# Patient Record
Sex: FEMALE | Race: WHITE
[De-identification: names, ages, dates, MRNs, and addresses within clinical notes are randomized per-mention and may not be internally consistent; named-entity substitution may affect disease eponyms.]

---

## 2018-03-01 ENCOUNTER — HOSPITAL ENCOUNTER (OUTPATIENT)
Dept: HOSPITAL 92 - BICMAMMO | Age: 58
Discharge: HOME | End: 2018-03-01
Attending: FAMILY MEDICINE
Payer: COMMERCIAL

## 2018-03-01 DIAGNOSIS — Z78.0: Primary | ICD-10-CM

## 2018-03-01 DIAGNOSIS — M81.0: ICD-10-CM

## 2018-03-01 PROCEDURE — 77080 DXA BONE DENSITY AXIAL: CPT

## 2018-03-09 ENCOUNTER — HOSPITAL ENCOUNTER (OUTPATIENT)
Dept: HOSPITAL 92 - BICMAMMO | Age: 58
Discharge: HOME | End: 2018-03-09
Attending: FAMILY MEDICINE
Payer: COMMERCIAL

## 2018-03-09 DIAGNOSIS — Z12.31: Primary | ICD-10-CM

## 2018-03-09 PROCEDURE — 77067 SCR MAMMO BI INCL CAD: CPT

## 2020-01-30 ENCOUNTER — HOSPITAL ENCOUNTER (INPATIENT)
Dept: HOSPITAL 92 - SURG A | Age: 60
LOS: 36 days | Discharge: HOME | DRG: 470 | End: 2020-03-06
Attending: ORTHOPAEDIC SURGERY | Admitting: ORTHOPAEDIC SURGERY
Payer: COMMERCIAL

## 2020-01-30 VITALS — BODY MASS INDEX: 29 KG/M2

## 2020-01-30 DIAGNOSIS — R19.7: ICD-10-CM

## 2020-01-30 DIAGNOSIS — M16.11: Primary | ICD-10-CM

## 2020-01-30 DIAGNOSIS — G43.909: ICD-10-CM

## 2020-01-30 DIAGNOSIS — F32.9: ICD-10-CM

## 2020-01-30 DIAGNOSIS — Z79.899: ICD-10-CM

## 2020-01-30 DIAGNOSIS — Z90.710: ICD-10-CM

## 2020-01-30 DIAGNOSIS — F43.10: ICD-10-CM

## 2020-01-30 DIAGNOSIS — I10: ICD-10-CM

## 2020-01-30 DIAGNOSIS — D53.9: ICD-10-CM

## 2020-01-30 DIAGNOSIS — G47.00: ICD-10-CM

## 2020-01-30 DIAGNOSIS — E78.5: ICD-10-CM

## 2020-01-30 PROCEDURE — 36415 COLL VENOUS BLD VENIPUNCTURE: CPT

## 2020-01-30 PROCEDURE — 85027 COMPLETE CBC AUTOMATED: CPT

## 2020-01-30 PROCEDURE — 90471 IMMUNIZATION ADMIN: CPT

## 2020-01-30 PROCEDURE — G0008 ADMIN INFLUENZA VIRUS VAC: HCPCS

## 2020-01-30 PROCEDURE — 84132 ASSAY OF SERUM POTASSIUM: CPT

## 2020-01-30 PROCEDURE — 90686 IIV4 VACC NO PRSV 0.5 ML IM: CPT

## 2020-02-04 ENCOUNTER — HOSPITAL ENCOUNTER (OUTPATIENT)
Dept: HOSPITAL 92 - LABBT | Age: 60
Discharge: HOME | End: 2020-02-04
Attending: ORTHOPAEDIC SURGERY
Payer: COMMERCIAL

## 2020-02-04 DIAGNOSIS — M16.11: ICD-10-CM

## 2020-02-04 DIAGNOSIS — Z01.818: Primary | ICD-10-CM

## 2020-02-04 LAB
ANION GAP SERPL CALC-SCNC: 12 MMOL/L (ref 10–20)
APTT PPP: 29.1 SEC (ref 22.9–36.1)
BASOPHILS # BLD AUTO: 0.1 THOU/UL (ref 0–0.2)
BASOPHILS NFR BLD AUTO: 0.7 % (ref 0–1)
BUN SERPL-MCNC: 15 MG/DL (ref 9.8–20.1)
CALCIUM SERPL-MCNC: 9.5 MG/DL (ref 7.8–10.44)
CHLORIDE SERPL-SCNC: 103 MMOL/L (ref 98–107)
CO2 SERPL-SCNC: 29 MMOL/L (ref 22–29)
CREAT CL PREDICTED SERPL C-G-VRATE: 0 ML/MIN (ref 70–130)
EOSINOPHIL # BLD AUTO: 0.2 THOU/UL (ref 0–0.7)
EOSINOPHIL NFR BLD AUTO: 2 % (ref 0–10)
GLUCOSE SERPL-MCNC: 99 MG/DL (ref 70–105)
HGB BLD-MCNC: 13.5 G/DL (ref 12–16)
INR PPP: 1
LYMPHOCYTES # BLD: 2.6 THOU/UL (ref 1.2–3.4)
LYMPHOCYTES NFR BLD AUTO: 30.7 % (ref 21–51)
MCH RBC QN AUTO: 28 PG (ref 27–31)
MCV RBC AUTO: 87.1 FL (ref 78–98)
MONOCYTES # BLD AUTO: 0.6 THOU/UL (ref 0.11–0.59)
MONOCYTES NFR BLD AUTO: 6.8 % (ref 0–10)
NEUTROPHILS # BLD AUTO: 5 THOU/UL (ref 1.4–6.5)
NEUTROPHILS NFR BLD AUTO: 59.8 % (ref 42–75)
PLATELET # BLD AUTO: 362 THOU/UL (ref 130–400)
POTASSIUM SERPL-SCNC: 3.2 MMOL/L (ref 3.5–5.1)
PROTHROMBIN TIME: 12.8 SEC (ref 12–14.7)
RBC # BLD AUTO: 4.83 MILL/UL (ref 4.2–5.4)
RBC UR QL AUTO: (no result) HPF (ref 0–3)
SODIUM SERPL-SCNC: 141 MMOL/L (ref 136–145)
WBC # BLD AUTO: 8.3 THOU/UL (ref 4.8–10.8)
WBC UR QL AUTO: (no result) HPF (ref 0–3)

## 2020-02-04 PROCEDURE — 87081 CULTURE SCREEN ONLY: CPT

## 2020-02-04 PROCEDURE — 81001 URINALYSIS AUTO W/SCOPE: CPT

## 2020-02-04 PROCEDURE — 80048 BASIC METABOLIC PNL TOTAL CA: CPT

## 2020-02-04 PROCEDURE — 85610 PROTHROMBIN TIME: CPT

## 2020-02-04 PROCEDURE — 93005 ELECTROCARDIOGRAM TRACING: CPT

## 2020-02-04 PROCEDURE — 85025 COMPLETE CBC W/AUTO DIFF WBC: CPT

## 2020-02-04 PROCEDURE — 93010 ELECTROCARDIOGRAM REPORT: CPT

## 2020-02-04 PROCEDURE — 85730 THROMBOPLASTIN TIME PARTIAL: CPT

## 2020-02-27 ENCOUNTER — HOSPITAL ENCOUNTER (OUTPATIENT)
Dept: HOSPITAL 92 - LABBT | Age: 60
Discharge: HOME | End: 2020-02-27
Attending: ORTHOPAEDIC SURGERY
Payer: COMMERCIAL

## 2020-02-27 DIAGNOSIS — Z01.812: Primary | ICD-10-CM

## 2020-02-27 DIAGNOSIS — M16.11: ICD-10-CM

## 2020-02-27 LAB
ANION GAP SERPL CALC-SCNC: 14 MMOL/L (ref 10–20)
BUN SERPL-MCNC: 22 MG/DL (ref 9.8–20.1)
CALCIUM SERPL-MCNC: 9.1 MG/DL (ref 7.8–10.44)
CHLORIDE SERPL-SCNC: 99 MMOL/L (ref 98–107)
CO2 SERPL-SCNC: 28 MMOL/L (ref 22–29)
CREAT CL PREDICTED SERPL C-G-VRATE: 0 ML/MIN (ref 70–130)
GLUCOSE SERPL-MCNC: 103 MG/DL (ref 70–105)
POTASSIUM SERPL-SCNC: 2.9 MMOL/L (ref 3.5–5.1)
SODIUM SERPL-SCNC: 138 MMOL/L (ref 136–145)

## 2020-02-27 PROCEDURE — 80048 BASIC METABOLIC PNL TOTAL CA: CPT

## 2020-03-03 LAB — POTASSIUM SERPL-SCNC: 4.2 MMOL/L (ref 3.5–5.1)

## 2020-03-03 PROCEDURE — 0SR903A REPLACEMENT OF RIGHT HIP JOINT WITH CERAMIC SYNTHETIC SUBSTITUTE, UNCEMENTED, OPEN APPROACH: ICD-10-PCS | Performed by: PHYSICIAN ASSISTANT

## 2020-03-03 RX ADMIN — DOCUSATE SODIUM 50 MG AND SENNOSIDES 8.6 MG SCH TAB: 8.6; 5 TABLET, FILM COATED ORAL at 21:07

## 2020-03-03 RX ADMIN — CEFAZOLIN SODIUM SCH MLS: 2 SOLUTION INTRAVENOUS at 18:06

## 2020-03-03 RX ADMIN — ASPIRIN SCH MG: 81 TABLET ORAL at 21:07

## 2020-03-03 NOTE — RAD
RIGHT HIP TWO VIEWS:

3/3/20

 

HISTORY: 

Postop total hip arthroplasty.

 

FINDINGS/IMPRESSION: 

There are recent postop changes of total hip arthroplasty in good position and alignment. 

 

POS: TPC

## 2020-03-03 NOTE — OP
DATE OF PROCEDURE:  03/03/2020



This is Giovanni Castrejon PA-C dictating for Clayton Romero MD.



ANESTHESIA:  General via endotracheal tube, augmented with indwelling epidural.



PREOPERATIVE DIAGNOSIS:  End-stage bicompartmental osteoarthritis, right hip.



POSTOPERATIVE DIAGNOSIS:  End-stage bicompartmental osteoarthritis, right hip.



PROCEDURE:  Press-fit right total hip arthroplasty.



SURGEON:  Clayton Romero MD.



ASSISTANT:  Giovanni Castrejon PA-C.



COMPONENTS USED:  [employer] Accolade II press-fit size 4 hip stem with a

size 52-mm Trident PSL press-fit acetabular shell, one 6.5 cancellous bone screw, a

36 mm ceramic femoral head with a -5 neck length. 



ESTIMATED BLOOD LOSS:  100 mL.



SPECIMENS:  None.



DRAINS:  None.



COMPLICATIONS:  None.



COUNTS:  Correct.



FINDINGS:  End-stage severe degenerative bicompartmental disease, bone-on-bone

arthrosis, periarticular osteophyte formation, large serous effusion, and

hypertrophic synovium, changes consistent with chronic bicompartmental

osteoarthritis. 



INDICATIONS FOR SURGERY:  Connie is a 59-year-old white female who has had

progressive right hip groin, and thigh pain and problems with standing and walking

for the last 5 to 7 years.  She has failed conservative management and elected to

proceed with total hip arthroplasty as definitive treatment of her pain. 



PROCEDURE IN DETAIL:  After informed consent was obtained in the preoperative

holding area, the patient was taken to the operative suite where general anesthesia

was induced.  The patient was then positioned in the lateral decubitus position.

The hip was then prepped and draped in usual sterile fashion.  The patient received

preoperative antibiotics.  Prior to incision, time-out was called and all members of

the surgical team agreed upon site, surgeon, and patient.  After this, a

longitudinal incision was made directly over the trochanter, noted by palpation

extending 2 fingerbreadths above and below the trochanter.  The deeper subcutaneous

layer was undermined with Bovie electrocautery.  The iliotibial band was encountered

and incised sharply and the plane below this was developed bluntly.  A Charnley

retractor was placed to hold this opened.  The lateral aspect of the trochanter and

the abductor muscles were encountered and then reflected anteriorly off the

trochanter using Bovie electrocautery.  Once this was completed, the anterior

capsule was then encountered and identified and copious capsulotomy was carried out,

exposing the femoral neck and head.  Dislocation maneuver was then performed and an

in situ provisional neck cut was then made using the oscillating saw.  Attention was

then turned to acetabular preparation.  Sequential reaming was carried out up to the

appropriate diameter and a trial was then malleted into place with good firm

resistance and no pullout.  The permanent acetabular shell was then malleted

squarely into place, as was the appropriate liner.  Once completed, the wound was

copiously irrigated and attention was then turned to femoral preparation.  Flexion

and external rotation were performed of the exposed thigh and femoral elevators were

then placed at the proximal aspect of the wound.  Canal finder was used to establish

the length of the canal and sequential reaming was carried out, followed by

broaching.  Once the appropriate stability was established with the trial broaches

with flexion, extension and rotational stability, we did trial with neutral and 2 mm

offset incremental necks.  Once the appropriate size was decided upon, with good

stability noted with flexion, extension, internal and external rotation and shuck

being negative, we removed the femoral trial broach and malletted into place the

permanent prosthesis with good firm fit, which was also stable to rotation.  Again,

the hip felt very stable to flexion, extension, internal and external rotation.  Leg

lengths appeared near anatomic clinically and we were quite happy with prosthesis

placement.  Copious irrigation was then carried out through the entirety of the

wound.  Primary closure of the abductors was accomplished with interrupted #2 Vicryl

figure-of-eight stitches and the IT band was then closed with interrupted #2 Vicryl,

oversewn with a #2 running barbed Quill stitch.  Subcutaneous fascia was closed with

running barbed Quill stitch and a subcuticular Monocryl barbed Quill stitch was used

for skin closure and augmented with skin cement.  A sterile dressing was applied.

The procedure was terminated without any complication.  All counts were correct.

The patient was awakened in the operative suite and taken to the recovery room in

stable condition. 







Job ID:  [id number]

## 2020-03-04 LAB
HGB BLD-MCNC: 10.9 G/DL (ref 12–16)
MCH RBC QN AUTO: 29.7 PG (ref 27–31)
MCV RBC AUTO: 89.5 FL (ref 78–98)
PLATELET # BLD AUTO: 247 THOU/UL (ref 130–400)
RBC # BLD AUTO: 3.65 MILL/UL (ref 4.2–5.4)
WBC # BLD AUTO: 10.6 THOU/UL (ref 4.8–10.8)

## 2020-03-04 RX ADMIN — ASPIRIN SCH MG: 81 TABLET ORAL at 21:00

## 2020-03-04 RX ADMIN — DOCUSATE SODIUM 50 MG AND SENNOSIDES 8.6 MG SCH TAB: 8.6; 5 TABLET, FILM COATED ORAL at 21:00

## 2020-03-04 RX ADMIN — HYDROCODONE BITARTRATE AND ACETAMINOPHEN PRN TAB: 5; 325 TABLET ORAL at 12:44

## 2020-03-04 RX ADMIN — Medication SCH MLS: at 17:11

## 2020-03-04 RX ADMIN — DOCUSATE SODIUM 50 MG AND SENNOSIDES 8.6 MG SCH TAB: 8.6; 5 TABLET, FILM COATED ORAL at 09:23

## 2020-03-04 RX ADMIN — ASPIRIN SCH MG: 81 TABLET ORAL at 09:22

## 2020-03-04 RX ADMIN — Medication SCH MLS: at 00:55

## 2020-03-04 RX ADMIN — MULTIPLE VITAMINS W/ MINERALS TAB SCH TAB: TAB at 09:24

## 2020-03-04 RX ADMIN — CEFAZOLIN SODIUM SCH MLS: 2 SOLUTION INTRAVENOUS at 01:23

## 2020-03-04 NOTE — PRG
DATE OF SERVICE:  03/04/2020



SUBJECTIVE:  __________ year-old female, postop day 1 from a right total hip

arthroplasty.  She is doing relatively well.  She has no complaints of pain.  She

has been doing relatively well overnight. 



OBJECTIVE:  GENERAL:  She is alert and oriented to person, place, time, and

situation.  Responsive and appropriate with examiner. 

VITAL SIGNS:  Temperature 99.6, pulse 68, respiratory rate 16 and nonlabored, blood

pressure 96/63. 

MUSCULOSKELETAL:  There is no shortening or malrotation.  No strike through

erythema.  She is neurovascularly intact in the right lower extremity. 



LABORATORY DATA:  Hemoglobin and hematocrit, 10.9 and 32.7.



IMPRESSION:  A 59-year-old female, postop day 1 right total hip arthroplasty, doing

well. 



PLAN:  Continue current care, probable discharge home tomorrow.







Job ID:  [id number]

## 2020-03-04 NOTE — CON
DATE OF CONSULTATION:  



REASON FOR CONSULTATION:  The Medicine Service was consulted for general medical

management. 



HISTORY OF PRESENT ILLNESS:  Ms. Galarza is a 59-year-old female with a medical

history of right hip osteoarthritis, that failed medical management, currently

postoperative day 1 status post right arthroplasty.  The patient tolerated the

procedure well and started undergoing physical therapy. 



On encounter, lying in bed comfortably.  Has no complaints except for right hip pain

prior to receiving pain medication.  Denies headache, fatigue, change in vision,

generalized weakness, chest pain, palpitations, shortness of breath, abdominal pain,

constipation, diarrhea, dysuria, burning on urination, or focal weakness. 



REVIEW OF SYSTEMS:  As mentioned in the HPI.  All other systems have been reviewed,

and the remainder were negative. 



PAST MEDICAL HISTORY:  PTSD, depression with suicidal attempt years ago, right hip

osteoarthritis, and hypertension. 



PAST SURGICAL HISTORY:   x4 and right hip arthroplasty.



FAMILY HISTORY:  Parents had hypertension.



SOCIAL HISTORY:  Does not smoke, drink, or do recreational drugs.  Has a history of

cocaine and marijuana use. 



ALLERGIES:  NONE.



HOME MEDICATIONS:  

1. Amlodipine.

2. Hydrochlorothiazide.

3. Sertraline.

4. Atorvastatin.

5. Clonazepam.

6. Trazodone.



PHYSICAL EXAMINATION:

VITAL SIGNS:  Afebrile, regular heart rate, regular respiratory rate, borderline

hypotension. 

GENERAL APPEARANCE:  No apparent distress.  Alert and oriented x3. 

HEENT:  Atraumatic and normocephalic. 

NECK:  Supple.  Symmetric.  No JVD. 

HEART:  Regular rate and rhythm.  No murmurs or gallops. 

Lung:  Clear to auscultation bilaterally.  No wheezing, rales, or rhonchi.  Not

tachypneic. 

ABDOMEN:  Soft, nontender, and nondistended. 

EXTREMITIES:  Right hip covered with clean dressing. 

PSYCHIATRIC:  Normal affect and behavior.  Alert and oriented x3.



IMPRESSION AND PLAN:  Ms. Galarza is a 59-year-old female with a medical history of

right hip arthroplasty status post failed medical management.  Currently,

postoperative day 1.  The Medicine Service was consulted for general medical

management. 

1. Hypertension.

a. Home regimen is hydrochlorothiazide and amlodipine.  The patient is currently

borderline hypotensive; therefore, amlodipine and hydrochlorothiazide were held. 

b. We will restart once the patient's blood pressure is above guidelines, which in

her case is above 140/90. 

2. Depression.

    a. The patient is currently taking sertraline.

    b. Sertraline was restarted as inpatient.

3. Insomnia.

    a. The patient is on home trazodone for insomnia.

    b. We will continue home regimen.

4. Preventive medicine.

a. No labs to determine atherosclerotic vascular disease risk for 10 years; however,

the patient has been taking atorvastatin at home.  Can be followed by primary care

physician upon discharge. 

5. Normocytic anemia.

a. The patient has a hemoglobin of 10.9 as compared to hemoglobin of 13.5 a month

ago. 

b. The patient's RDW and MCV are within normal limits suggesting that anemia is

likely due to surgical blood loss or anemia of chronic disease.  Can be followed as

outpatient by primary care physician. 

6. Prophylaxis and disposition.

    a. The patient is full code.

    b. Gastrointestinal prophylaxis:  No indication.

c. Deep venous thrombosis prophylaxis:  The patient has sequential compression

devices. 







Job ID:  [id number]

## 2020-03-05 LAB
HGB BLD-MCNC: 11 G/DL (ref 12–16)
MCH RBC QN AUTO: 28.4 PG (ref 27–31)
MCV RBC AUTO: 90.5 FL (ref 78–98)
PLATELET # BLD AUTO: 252 THOU/UL (ref 130–400)
RBC # BLD AUTO: 3.87 MILL/UL (ref 4.2–5.4)
WBC # BLD AUTO: 9.1 THOU/UL (ref 4.8–10.8)

## 2020-03-05 RX ADMIN — ASPIRIN SCH MG: 81 TABLET ORAL at 09:19

## 2020-03-05 RX ADMIN — MULTIPLE VITAMINS W/ MINERALS TAB SCH TAB: TAB at 09:19

## 2020-03-05 RX ADMIN — HYDROCODONE BITARTRATE AND ACETAMINOPHEN PRN TAB: 5; 325 TABLET ORAL at 21:07

## 2020-03-05 RX ADMIN — ASPIRIN SCH MG: 81 TABLET ORAL at 21:08

## 2020-03-05 RX ADMIN — DOCUSATE SODIUM 50 MG AND SENNOSIDES 8.6 MG SCH: 8.6; 5 TABLET, FILM COATED ORAL at 21:08

## 2020-03-05 RX ADMIN — HYDROCODONE BITARTRATE AND ACETAMINOPHEN PRN TAB: 5; 325 TABLET ORAL at 09:23

## 2020-03-05 RX ADMIN — DOCUSATE SODIUM 50 MG AND SENNOSIDES 8.6 MG SCH: 8.6; 5 TABLET, FILM COATED ORAL at 09:21

## 2020-03-05 NOTE — PDOC.HOSPP
- Subjective


Encounter Date: 03/05/20


Encounter Time: 14:00


Subjective: 





no overnight events. Had multiple small loose bowel movements overnight. Feels 

well





- Objective


Vital Signs & Weight: 


 Vital Signs (12 hours)











  Temp Pulse Resp BP BP Pulse Ox


 


 03/05/20 15:00  98.0 F  74  12   109/62  94 L


 


 03/05/20 13:39  98.1 F     


 


 03/05/20 12:00  99.3 F  76  14   99/58 L  100


 


 03/05/20 09:20   86   110/70  








 Weight











Admit Weight                   180 lb


 


Weight                         180 lb














I&O: 


 











 03/04/20 03/05/20 03/06/20





 06:59 06:59 06:59


 


Intake Total 2000 1178 500


 


Output Total 1300 1575 250


 


Balance 700 -397 250











Result Diagrams: 


 03/05/20 05:21





 03/03/20 10:22





Hospitalist ROS





- Review of Systems


Constitutional: denies: fever, chills, sweats, weakness, malaise, other


Respiratory: denies: cough, dry, shortness of breath, hemoptysis, SOB with 

excertion, pleuritic pain, sputum, wheezing, other


Cardiovascular: denies: chest pain, palpitations, orthopnea, paroxysmal noc. 

dyspnea, edema, light headedness, other


Gastrointestinal: reports: diarrhea.  denies: nausea, vomiting, abdominal pain, 

constipation, melena, hematochezia, other





- Medication


Medications: 


Active Medications











Generic Name Dose Route Start Last Admin





  Trade Name Freq  PRN Reason Stop Dose Admin


 


Acetaminophen  650 mg  03/03/20 12:21  03/05/20 12:38





  Tylenol  PO   650 mg





  Q4H PRN   Administration





  Headache/Fever or Pain   





     





     





     


 


Hydrocodone Bitart/Acetaminophen  1 tab  03/04/20 12:31  03/05/20 09:23





  Kimball 5/325  PO   1 tab





  Q4H PRN   Administration





  PAIN SCALE 4-6   





     





     





     


 


Amlodipine Besylate  10 mg  03/04/20 09:00  03/05/20 09:20





  Norvasc  PO   Not Given





  DAILY TALITA   





     





     





     





     


 


Aspirin  81 mg  03/03/20 21:00  03/05/20 09:19





  Ecotrin  PO   81 mg





  BID TALITA   Administration





     





     





     





     


 


Atorvastatin Calcium  20 mg  03/04/20 09:00  03/05/20 09:19





  Lipitor  PO   20 mg





  DAILY TALITA   Administration





     





     





     





     


 


Diphenhydramine HCl  25 mg  03/03/20 12:15  03/04/20 16:49





  Benadryl  IVP   25 mg





  Q3H PRN   Administration





  Itching   





     





     





     


 


Diphenhydramine HCl  25 mg  03/03/20 12:21  03/04/20 01:35





  Benadryl  PO   25 mg





  Q6H PRN   Administration





  Itching   





     





     





     


 


Ferrous Gluconate  324 mg  03/03/20 21:00  03/05/20 09:19





  Fergon  PO   324 mg





  BID TALITA   Administration





     





     





     





     


 


Hydrochlorothiazide  25 mg  03/04/20 09:00  03/05/20 09:21





  Hydrochlorothiazide  PO   Not Given





  DAILY TALITA   





     





     





     





     


 


Sodium Chloride  1,000 mls @ 100 mls/hr  03/03/20 12:30  03/05/20 14:18





  Normal Saline 0.9%  IV   Not Given





  .Q10H TALITA   





     





     





     





     


 


Iron/Minerals/Multivitamins  1 tab  03/04/20 09:00  03/05/20 09:19





  Theragran M  PO   1 tab





  DAILY TALITA   Administration





     





     





     





     


 


Ketorolac Tromethamine  30 mg  03/04/20 12:00  03/05/20 17:19





  Toradol  IVP  03/09/20 12:01  30 mg





  Q6HR TALITA   Administration





     





     





     





     


 


Senna/Docusate Sodium  2 tab  03/03/20 21:00  03/05/20 09:21





  Senokot S  PO   Not Given





  BID TALITA   





     





     





     





     


 


Sertraline HCl  200 mg  03/04/20 09:00  03/05/20 09:19





  Zoloft  PO   200 mg





  DAILY TALITA   Administration





     





     





     





     


 


Sodium Chloride  10 ml  03/03/20 12:21  03/05/20 11:53





  Flush - Normal Saline  IVF   10 ml





  PRN PRN   Administration





  Saline Flush   





     





     





     


 


Trazodone HCl  100 mg  03/03/20 21:00  03/04/20 21:00





  Desyrel  PO   100 mg





  HS TALITA   Administration





     





     





     





     














- Exam


General Appearance: NAD, awake alert


Neck: supple, symmetric, no JVD, no thyromegaly, no lymphadenopathy, no carotid 

bruit


Heart: RRR, no murmur, no gallops, no rubs, normal peripheral pulses


Respiratory: CTAB, no wheezes, no rales, no ronchi, normal chest expansion, no 

tachypnea, normal percussion


Gastrointestinal: soft, non-tender, non-distended, normal bowel sounds, no 

palpable masses, no hepatomegaly, no splenomegaly, no bruit





Hosp A/P





- Plan





#Diarrhea


-multiple small loose bowel movements overnight





-will change senna/doc from scheduled to PRN considering loose bowel movements 

and deescalation of opoids pain management








otherwise, management unchanged. will continue to follow

## 2020-03-06 VITALS — DIASTOLIC BLOOD PRESSURE: 73 MMHG | SYSTOLIC BLOOD PRESSURE: 135 MMHG | TEMPERATURE: 98.7 F

## 2020-03-06 LAB
HGB BLD-MCNC: 10.4 G/DL (ref 12–16)
MCH RBC QN AUTO: 29.5 PG (ref 27–31)
MCV RBC AUTO: 91.2 FL (ref 78–98)
PLATELET # BLD AUTO: 237 THOU/UL (ref 130–400)
RBC # BLD AUTO: 3.53 MILL/UL (ref 4.2–5.4)
WBC # BLD AUTO: 7.3 THOU/UL (ref 4.8–10.8)

## 2020-03-06 RX ADMIN — ASPIRIN SCH MG: 81 TABLET ORAL at 08:48

## 2020-03-06 RX ADMIN — HYDROCODONE BITARTRATE AND ACETAMINOPHEN PRN TAB: 5; 325 TABLET ORAL at 13:49

## 2020-03-06 RX ADMIN — MULTIPLE VITAMINS W/ MINERALS TAB SCH TAB: TAB at 08:48

## 2020-03-06 NOTE — DIS
DATE OF ADMISSION:  03/03/2020



DATE OF DISCHARGE:  03/06/2020



This is Queenie Lea PA-C dictating a report for Clayton Romero MD.



CONSULTANTS:  [facility]. 



PREOPERATIVE DIAGNOSIS:  Degenerative osteoarthritis of right hip.



POSTOPERATIVE DIAGNOSIS:  Degenerative osteoarthritis of right hip.



PROCEDURE PERFORMED:  Right total hip arthroplasty.



BRIEF HOSPITAL COURSE:  This is a 59-year-old female, who was indicated for the

above-mentioned procedure after failing conservative treatment in the outpatient

setting.  She did well in the operative suite and postoperatively was admitted to

[address]  Here, she worked with physical and

occupational therapist.  She was out of bed on the day of surgery.  By postop day

#1, she was standing at bedside and progressing quite nicely.  Her pain was managed

by [facility].  She continued to improve on postop days 2 and

3, and on postop day 3, she was discharged home.  No complications incurred

throughout her hospital stay. 



DISCHARGE DISPOSITION:  Home.



DISCHARGE CONDITION:  Stable.



DISCHARGE INSTRUCTIONS:  The patient will follow up with Dr. Romero as scheduled.

She will follow up with therapy as scheduled.  She will keep her surgical site

clean, dry, and intact until followup. 



DISCHARGE MEDICATIONS:  See MAR.





Job ID:  [id number]

## 2021-03-10 ENCOUNTER — HOSPITAL ENCOUNTER (OUTPATIENT)
Dept: HOSPITAL 92 - LABBT | Age: 61
Discharge: HOME | End: 2021-03-10
Attending: ORTHOPAEDIC SURGERY
Payer: COMMERCIAL

## 2021-03-10 DIAGNOSIS — Z01.818: Primary | ICD-10-CM

## 2021-03-10 DIAGNOSIS — M17.12: ICD-10-CM

## 2021-03-10 DIAGNOSIS — Z20.822: ICD-10-CM

## 2021-03-10 LAB
ANION GAP SERPL CALC-SCNC: 14 MMOL/L (ref 10–20)
BACTERIA UR QL AUTO: (no result) HPF
BASOPHILS # BLD AUTO: 0.1 10X3/UL (ref 0–0.2)
BASOPHILS NFR BLD AUTO: 0.7 % (ref 0–2)
BUN SERPL-MCNC: 16 MG/DL (ref 9.8–20.1)
CALCIUM SERPL-MCNC: 9 MG/DL (ref 7.8–10.44)
CHLORIDE SERPL-SCNC: 101 MMOL/L (ref 98–107)
CO2 SERPL-SCNC: 30 MMOL/L (ref 22–29)
CREAT CL PREDICTED SERPL C-G-VRATE: 0 ML/MIN (ref 70–130)
EOSINOPHIL # BLD AUTO: 0.4 10X3/UL (ref 0–0.5)
EOSINOPHIL NFR BLD AUTO: 5.6 % (ref 0–6)
GLUCOSE SERPL-MCNC: 104 MG/DL (ref 70–105)
HGB BLD-MCNC: 13.5 G/DL (ref 12–15.5)
INR PPP: 0.9
LYMPHOCYTES NFR BLD AUTO: 30.7 % (ref 18–47)
MCH RBC QN AUTO: 29.3 PG (ref 27–33)
MCV RBC AUTO: 90.9 FL (ref 81.6–98.3)
MONOCYTES # BLD AUTO: 0.6 10X3/UL (ref 0–1.1)
MONOCYTES NFR BLD AUTO: 8.1 % (ref 0–10)
NEUTROPHILS # BLD AUTO: 3.9 10X3/UL (ref 1.5–8.4)
NEUTROPHILS NFR BLD AUTO: 54.6 % (ref 40–75)
PLATELET # BLD AUTO: 335 10X3/UL (ref 150–450)
POTASSIUM SERPL-SCNC: 3.8 MMOL/L (ref 3.5–5.1)
PROTHROMBIN TIME: 9.6 SEC (ref 9.5–12.1)
RBC # BLD AUTO: 4.61 10X6/UL (ref 3.9–5.03)
RBC UR QL AUTO: (no result) HPF (ref 0–3)
SODIUM SERPL-SCNC: 141 MMOL/L (ref 136–145)
SP GR UR STRIP: 1.01 (ref 1–1.04)
WBC # BLD AUTO: 7.2 10X3/UL (ref 3.5–10.5)
WBC UR QL AUTO: (no result) HPF (ref 0–3)

## 2021-03-10 PROCEDURE — 87635 SARS-COV-2 COVID-19 AMP PRB: CPT

## 2021-03-10 PROCEDURE — 85610 PROTHROMBIN TIME: CPT

## 2021-03-10 PROCEDURE — 93010 ELECTROCARDIOGRAM REPORT: CPT

## 2021-03-10 PROCEDURE — 85025 COMPLETE CBC W/AUTO DIFF WBC: CPT

## 2021-03-10 PROCEDURE — 80048 BASIC METABOLIC PNL TOTAL CA: CPT

## 2021-03-10 PROCEDURE — 93005 ELECTROCARDIOGRAM TRACING: CPT

## 2021-03-10 PROCEDURE — U0003 INFECTIOUS AGENT DETECTION BY NUCLEIC ACID (DNA OR RNA); SEVERE ACUTE RESPIRATORY SYNDROME CORONAVIRUS 2 (SARS-COV-2) (CORONAVIRUS DISEASE [COVID-19]), AMPLIFIED PROBE TECHNIQUE, MAKING USE OF HIGH THROUGHPUT TECHNOLOGIES AS DESCRIBED BY CMS-2020-01-R: HCPCS

## 2021-03-10 PROCEDURE — U0005 INFEC AGEN DETEC AMPLI PROBE: HCPCS

## 2021-03-10 PROCEDURE — 81001 URINALYSIS AUTO W/SCOPE: CPT

## 2021-03-10 PROCEDURE — 87081 CULTURE SCREEN ONLY: CPT

## 2021-03-15 ENCOUNTER — HOSPITAL ENCOUNTER (INPATIENT)
Dept: HOSPITAL 92 - SDC | Age: 61
LOS: 2 days | Discharge: HOME | DRG: 470 | End: 2021-03-17
Attending: ORTHOPAEDIC SURGERY | Admitting: ORTHOPAEDIC SURGERY
Payer: COMMERCIAL

## 2021-03-15 VITALS — BODY MASS INDEX: 29 KG/M2

## 2021-03-15 DIAGNOSIS — Z88.8: ICD-10-CM

## 2021-03-15 DIAGNOSIS — Z79.82: ICD-10-CM

## 2021-03-15 DIAGNOSIS — Z20.822: ICD-10-CM

## 2021-03-15 DIAGNOSIS — F32.9: ICD-10-CM

## 2021-03-15 DIAGNOSIS — F41.9: ICD-10-CM

## 2021-03-15 DIAGNOSIS — Z79.899: ICD-10-CM

## 2021-03-15 DIAGNOSIS — G43.909: ICD-10-CM

## 2021-03-15 DIAGNOSIS — Z90.710: ICD-10-CM

## 2021-03-15 DIAGNOSIS — J30.2: ICD-10-CM

## 2021-03-15 DIAGNOSIS — M17.12: Primary | ICD-10-CM

## 2021-03-15 PROCEDURE — 90471 IMMUNIZATION ADMIN: CPT

## 2021-03-15 PROCEDURE — C1776 JOINT DEVICE (IMPLANTABLE): HCPCS

## 2021-03-15 PROCEDURE — 85027 COMPLETE CBC AUTOMATED: CPT

## 2021-03-15 PROCEDURE — 8E0YXBZ COMPUTER ASSISTED PROCEDURE OF LOWER EXTREMITY: ICD-10-PCS | Performed by: ORTHOPAEDIC SURGERY

## 2021-03-15 PROCEDURE — 0SRD0J9 REPLACEMENT OF LEFT KNEE JOINT WITH SYNTHETIC SUBSTITUTE, CEMENTED, OPEN APPROACH: ICD-10-PCS | Performed by: ORTHOPAEDIC SURGERY

## 2021-03-15 PROCEDURE — 36415 COLL VENOUS BLD VENIPUNCTURE: CPT

## 2021-03-15 PROCEDURE — 90662 IIV NO PRSV INCREASED AG IM: CPT

## 2021-03-15 PROCEDURE — G0008 ADMIN INFLUENZA VIRUS VAC: HCPCS

## 2021-03-15 PROCEDURE — C1713 ANCHOR/SCREW BN/BN,TIS/BN: HCPCS

## 2021-03-15 RX ADMIN — ASPIRIN SCH MG: 81 TABLET ORAL at 20:24

## 2021-03-15 RX ADMIN — CEFAZOLIN SODIUM SCH MLS: 2 SOLUTION INTRAVENOUS at 23:06

## 2021-03-15 RX ADMIN — CEFAZOLIN SODIUM SCH MLS: 2 SOLUTION INTRAVENOUS at 16:20

## 2021-03-16 LAB
HGB BLD-MCNC: 9.9 G/DL (ref 12–16)
MCH RBC QN AUTO: 30.9 PG (ref 27–31)
MCV RBC AUTO: 90.3 FL (ref 78–98)
PLATELET # BLD AUTO: 236 THOU/UL (ref 130–400)
RBC # BLD AUTO: 3.19 MILL/UL (ref 4.2–5.4)
WBC # BLD AUTO: 11.6 THOU/UL (ref 4.8–10.8)

## 2021-03-16 RX ADMIN — ASPIRIN SCH MG: 81 TABLET ORAL at 19:59

## 2021-03-16 RX ADMIN — DOCUSATE SODIUM 50 MG AND SENNOSIDES 8.6 MG SCH TAB: 8.6; 5 TABLET, FILM COATED ORAL at 08:51

## 2021-03-16 RX ADMIN — HYDROCODONE BITARTRATE AND ACETAMINOPHEN PRN TAB: 10; 325 TABLET ORAL at 12:54

## 2021-03-16 RX ADMIN — DOCUSATE SODIUM 50 MG AND SENNOSIDES 8.6 MG SCH TAB: 8.6; 5 TABLET, FILM COATED ORAL at 19:59

## 2021-03-16 RX ADMIN — HYDROCODONE BITARTRATE AND ACETAMINOPHEN PRN TAB: 10; 325 TABLET ORAL at 08:49

## 2021-03-16 RX ADMIN — MULTIPLE VITAMINS W/ MINERALS TAB SCH TAB: TAB at 08:50

## 2021-03-16 RX ADMIN — ASPIRIN SCH MG: 81 TABLET ORAL at 08:50

## 2021-03-17 VITALS — TEMPERATURE: 98.5 F | SYSTOLIC BLOOD PRESSURE: 126 MMHG | DIASTOLIC BLOOD PRESSURE: 79 MMHG

## 2021-03-17 LAB
HGB BLD-MCNC: 9.5 G/DL (ref 12–16)
MCH RBC QN AUTO: 30.7 PG (ref 27–31)
MCV RBC AUTO: 91.2 FL (ref 78–98)
PLATELET # BLD AUTO: 231 THOU/UL (ref 130–400)
RBC # BLD AUTO: 3.09 MILL/UL (ref 4.2–5.4)
WBC # BLD AUTO: 9 THOU/UL (ref 4.8–10.8)

## 2021-03-17 RX ADMIN — DOCUSATE SODIUM 50 MG AND SENNOSIDES 8.6 MG SCH TAB: 8.6; 5 TABLET, FILM COATED ORAL at 08:39

## 2021-03-17 RX ADMIN — ASPIRIN SCH MG: 81 TABLET ORAL at 08:39

## 2021-03-17 RX ADMIN — HYDROCODONE BITARTRATE AND ACETAMINOPHEN PRN TAB: 10; 325 TABLET ORAL at 11:31

## 2021-03-17 RX ADMIN — HYDROCODONE BITARTRATE AND ACETAMINOPHEN PRN TAB: 10; 325 TABLET ORAL at 07:36

## 2021-03-17 RX ADMIN — MULTIPLE VITAMINS W/ MINERALS TAB SCH TAB: TAB at 08:39
